# Patient Record
Sex: MALE | Race: ASIAN
[De-identification: names, ages, dates, MRNs, and addresses within clinical notes are randomized per-mention and may not be internally consistent; named-entity substitution may affect disease eponyms.]

---

## 2022-05-13 ENCOUNTER — HOSPITAL ENCOUNTER (INPATIENT)
Dept: HOSPITAL 54 - ER | Age: 47
LOS: 2 days | Discharge: HOME | DRG: 48 | End: 2022-05-15
Attending: INTERNAL MEDICINE | Admitting: INTERNAL MEDICINE
Payer: COMMERCIAL

## 2022-05-13 VITALS — WEIGHT: 220 LBS | HEIGHT: 67 IN | BODY MASS INDEX: 34.53 KG/M2

## 2022-05-13 DIAGNOSIS — I69.351: ICD-10-CM
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
## 2022-05-13 DIAGNOSIS — G90.8: Primary | ICD-10-CM

## 2022-05-13 DIAGNOSIS — I95.9: ICD-10-CM

## 2022-05-13 DIAGNOSIS — Z79.899: ICD-10-CM

## 2022-05-13 DIAGNOSIS — E88.09: ICD-10-CM

## 2022-05-13 DIAGNOSIS — E87.6: ICD-10-CM

## 2022-05-13 DIAGNOSIS — N17.0: ICD-10-CM

## 2022-05-13 DIAGNOSIS — T50.2X5A: ICD-10-CM

## 2022-05-13 DIAGNOSIS — I10: ICD-10-CM

## 2022-05-13 LAB
ALBUMIN SERPL BCP-MCNC: 3.8 G/DL (ref 3.4–5)
ALP SERPL-CCNC: 37 U/L (ref 46–116)
ALT SERPL W P-5'-P-CCNC: 23 U/L (ref 12–78)
AST SERPL W P-5'-P-CCNC: 21 U/L (ref 15–37)
BASOPHILS # BLD AUTO: 0 K/UL (ref 0–0.2)
BASOPHILS NFR BLD AUTO: 0.3 % (ref 0–2)
BILIRUB DIRECT SERPL-MCNC: 0.2 MG/DL (ref 0–0.2)
BILIRUB SERPL-MCNC: 0.5 MG/DL (ref 0.2–1)
BUN SERPL-MCNC: 24 MG/DL (ref 7–18)
CALCIUM SERPL-MCNC: 8.6 MG/DL (ref 8.5–10.1)
CHLORIDE SERPL-SCNC: 102 MMOL/L (ref 98–107)
CO2 SERPL-SCNC: 27 MMOL/L (ref 21–32)
CREAT SERPL-MCNC: 2.1 MG/DL (ref 0.6–1.3)
EOSINOPHIL NFR BLD AUTO: 1.1 % (ref 0–6)
GLUCOSE SERPL-MCNC: 191 MG/DL (ref 74–106)
HCT VFR BLD AUTO: 44 % (ref 39–51)
HGB BLD-MCNC: 14.7 G/DL (ref 13.5–17.5)
LYMPHOCYTES NFR BLD AUTO: 1.4 K/UL (ref 0.8–4.8)
LYMPHOCYTES NFR BLD AUTO: 12.3 % (ref 20–44)
MCHC RBC AUTO-ENTMCNC: 33 G/DL (ref 31–36)
MCV RBC AUTO: 91 FL (ref 80–96)
MONOCYTES NFR BLD AUTO: 0.5 K/UL (ref 0.1–1.3)
MONOCYTES NFR BLD AUTO: 4 % (ref 2–12)
NEUTROPHILS # BLD AUTO: 9.5 K/UL (ref 1.8–8.9)
NEUTROPHILS NFR BLD AUTO: 82.3 % (ref 43–81)
PLATELET # BLD AUTO: 249 K/UL (ref 150–450)
POTASSIUM SERPL-SCNC: 3.6 MMOL/L (ref 3.5–5.1)
PROT SERPL-MCNC: 7.1 G/DL (ref 6.4–8.2)
RBC # BLD AUTO: 4.88 MIL/UL (ref 4.5–6)
SODIUM SERPL-SCNC: 137 MMOL/L (ref 136–145)
WBC NRBC COR # BLD AUTO: 11.6 K/UL (ref 4.3–11)

## 2022-05-13 PROCEDURE — G0378 HOSPITAL OBSERVATION PER HR: HCPCS

## 2022-05-13 PROCEDURE — G0480 DRUG TEST DEF 1-7 CLASSES: HCPCS

## 2022-05-13 PROCEDURE — C9803 HOPD COVID-19 SPEC COLLECT: HCPCS

## 2022-05-13 NOTE — NUR
TO ER BED 9. BIBRA FOR WITNESSED SYNOCOPAL EPISODE AT [facility]. PT STATES HX OF 
STROKE. CONNECTED TO MONITOR. AWAITING MD PECK

## 2022-05-14 VITALS — DIASTOLIC BLOOD PRESSURE: 63 MMHG | SYSTOLIC BLOOD PRESSURE: 94 MMHG

## 2022-05-14 VITALS — DIASTOLIC BLOOD PRESSURE: 77 MMHG | SYSTOLIC BLOOD PRESSURE: 124 MMHG

## 2022-05-14 VITALS — DIASTOLIC BLOOD PRESSURE: 73 MMHG | SYSTOLIC BLOOD PRESSURE: 101 MMHG

## 2022-05-14 VITALS — DIASTOLIC BLOOD PRESSURE: 75 MMHG | SYSTOLIC BLOOD PRESSURE: 104 MMHG

## 2022-05-14 VITALS — DIASTOLIC BLOOD PRESSURE: 55 MMHG | SYSTOLIC BLOOD PRESSURE: 90 MMHG

## 2022-05-14 VITALS — SYSTOLIC BLOOD PRESSURE: 92 MMHG | DIASTOLIC BLOOD PRESSURE: 44 MMHG

## 2022-05-14 LAB
ALBUMIN SERPL BCP-MCNC: 3.6 G/DL (ref 3.4–5)
ALP SERPL-CCNC: 36 U/L (ref 46–116)
ALT SERPL W P-5'-P-CCNC: 20 U/L (ref 12–78)
AST SERPL W P-5'-P-CCNC: 19 U/L (ref 15–37)
BASOPHILS # BLD AUTO: 0 K/UL (ref 0–0.2)
BASOPHILS NFR BLD AUTO: 0.6 % (ref 0–2)
BILIRUB SERPL-MCNC: 0.3 MG/DL (ref 0.2–1)
BILIRUB UR QL STRIP: (no result)
BUN SERPL-MCNC: 26 MG/DL (ref 7–18)
CALCIUM SERPL-MCNC: 8.3 MG/DL (ref 8.5–10.1)
CHLORIDE SERPL-SCNC: 104 MMOL/L (ref 98–107)
CO2 SERPL-SCNC: 26 MMOL/L (ref 21–32)
COLOR UR: YELLOW
CREAT SERPL-MCNC: 1.9 MG/DL (ref 0.6–1.3)
EOSINOPHIL NFR BLD AUTO: 2.7 % (ref 0–6)
GLUCOSE SERPL-MCNC: 111 MG/DL (ref 74–106)
GLUCOSE UR STRIP-MCNC: NEGATIVE MG/DL
HCT VFR BLD AUTO: 43 % (ref 39–51)
HGB BLD-MCNC: 14.1 G/DL (ref 13.5–17.5)
LEUKOCYTE ESTERASE UR QL STRIP: NEGATIVE
LYMPHOCYTES NFR BLD AUTO: 2.4 K/UL (ref 0.8–4.8)
LYMPHOCYTES NFR BLD AUTO: 28.1 % (ref 20–44)
MAGNESIUM SERPL-MCNC: 2.3 MG/DL (ref 1.8–2.4)
MCHC RBC AUTO-ENTMCNC: 33 G/DL (ref 31–36)
MCV RBC AUTO: 91 FL (ref 80–96)
MONOCYTES NFR BLD AUTO: 0.7 K/UL (ref 0.1–1.3)
MONOCYTES NFR BLD AUTO: 7.9 % (ref 2–12)
NEUTROPHILS # BLD AUTO: 5.1 K/UL (ref 1.8–8.9)
NEUTROPHILS NFR BLD AUTO: 60.7 % (ref 43–81)
NITRITE UR QL STRIP: NEGATIVE
PH UR STRIP: 6 [PH] (ref 5–8)
PHOSPHATE SERPL-MCNC: 3.4 MG/DL (ref 2.5–4.9)
PLATELET # BLD AUTO: 244 K/UL (ref 150–450)
POTASSIUM SERPL-SCNC: 3.5 MMOL/L (ref 3.5–5.1)
PROT SERPL-MCNC: 6.8 G/DL (ref 6.4–8.2)
PROT UR QL STRIP: 30 MG/DL
RBC # BLD AUTO: 4.68 MIL/UL (ref 4.5–6)
SODIUM SERPL-SCNC: 138 MMOL/L (ref 136–145)
TSH SERPL DL<=0.005 MIU/L-ACNC: 0.42 UIU/ML (ref 0.36–3.74)
UROBILINOGEN UR STRIP-MCNC: 1 EU/DL
WBC NRBC COR # BLD AUTO: 8.4 K/UL (ref 4.3–11)

## 2022-05-14 RX ADMIN — PANTOPRAZOLE SODIUM SCH MG: 40 TABLET, DELAYED RELEASE ORAL at 08:37

## 2022-05-14 NOTE — NUR
RN OPENING NOTE



PATIENT RECEIVED IN BED, AO X 4. ABLE TO RESPONDS ALL STIMULI. IN NO ACUTE DISTRESS NOTED. 
RESPIRATORY EVEN AND UNLABORED ON ROOM AIR. SKIN IS WARM TO TOUCH, KEEP CLEAN/DRY. KEPT 
ELEVATED HOB FOR ENSURE AIRWAY AND ASPIRATION PRECAUTION, ALSO LOWEST POSITION OF THE BED, 
S/R UP X 3, BED ALARM IS ON AT ALL THE TIMES. ALL SAFETY PRECAUTION APPLIED. CALL LIGHT 
WITHIN REACH, WILL CONTINUE TO MONITOR.

## 2022-05-14 NOTE — NUR
RN CLOSING NOTES



PT IN BED, ASLEEP, AWAKENS TO VERBAL STIMULI. AOx4, ABLE TO MAKE NEEDS KNOWN. ON RA AND 
TOLERATING WELL. NO SOB NOTED. NO S/SX OF RESPIRATORY DISTRESS NOTED. TELE MONITOR DETECTS 
SINUS BRADYCARDIA WITH RATE OF 58. IV ACCESS IN R HAND #20G RUNNING NS @75 ML/HR. ALL ORDERS 
CARRIED OUT. ALL NEEDS MET. PT KEPT CLEAN AND DRY. SAFETY PRECAUTIONS IN PLACE: BED  IN 
LOWEST, LOCKED POSITION, SIDERAILS UPx2, AND BRAKES ON. TABLE AND CALL LIGHT WITHIN REACH. 
WILL ENDORSE TO ONCOMING SHIFT FOR FRANCISCO.

## 2022-05-14 NOTE — NUR
RN NOTE



PATIENT AWAKE IN BED. A/OX4. NO S/S OF DISTRESS; BREATHING WELL ON RM AIR W/O DIFFICULTY. 
L-HAND #22 INTACT AND PATENT W/ NS 75ML/HR. TELE MONITOR REVEALS SR 76. SAFETY MEASURES IN 
PLACE: BED AT LOWEST POSITION, LOCKED, RAILS UP X3, CALL BELL WITHIN REACH. WILL CONTINUE TO 
MONITOR.

## 2022-05-14 NOTE — NUR
RN CLOSING NOTE



PATIENT IN BED, IN NO ACUTE DISTRESS OBSERVED. SKIN IS WARM TO TOUCH KEEP CLEAN/DRY. 
RESPIRATORY EVEN AND UNLABORED ON ROOM AIR. PATIENT WAITING US KIDNEY AND BLADDER TO BE 
DONE. KEPT ELEVATED HOB FOR ENSURE AIRWAY AND ASPIRATION PRECAUTION, AND LOWEST POSITION OF 
THE BED FOR SAFETY. CALL LIGHT WITHIN REACH, WILL ENDORSE TO NIGHT SHIFT.

## 2022-05-14 NOTE — NUR
ADMISSION NOTES



PT ARRIVED VIA GURNEY ACCOMPANIED BY RN AND EMT @0250. AOx4, ABLE TO MAKE NEEDS KNOWN. ON RA 
AND TOLERATING WELL. NO SOB NOTED. NO S/SX OF RESPIRATORY DISTRESS NOTED. TELE MONITOR 
DETECTS SINUS BRADYCARDIA WITH RATE OF 58. IV ACCES IN R HAND #20G RUNNING NS @75 ML/HR. 
SAFETY PRECAUTIONS IN PLACE: BED  IN LOWEST, LOCKED POSITION, SIDERAILS UPx2, AND BRAKES ON. 
TABLE AND CALL LIGHT WITHIN REACH. WILL CONTINUE TO MONITOR.

## 2022-05-15 VITALS — DIASTOLIC BLOOD PRESSURE: 72 MMHG | SYSTOLIC BLOOD PRESSURE: 107 MMHG

## 2022-05-15 VITALS — DIASTOLIC BLOOD PRESSURE: 60 MMHG | SYSTOLIC BLOOD PRESSURE: 121 MMHG

## 2022-05-15 VITALS — SYSTOLIC BLOOD PRESSURE: 127 MMHG | DIASTOLIC BLOOD PRESSURE: 88 MMHG

## 2022-05-15 LAB
ALBUMIN SERPL BCP-MCNC: 3.4 G/DL (ref 3.4–5)
ALP SERPL-CCNC: 35 U/L (ref 46–116)
ALT SERPL W P-5'-P-CCNC: 20 U/L (ref 12–78)
AST SERPL W P-5'-P-CCNC: 14 U/L (ref 15–37)
BASOPHILS # BLD AUTO: 0 K/UL (ref 0–0.2)
BASOPHILS NFR BLD AUTO: 0.5 % (ref 0–2)
BILIRUB SERPL-MCNC: 0.3 MG/DL (ref 0.2–1)
BUN SERPL-MCNC: 16 MG/DL (ref 7–18)
BUN SERPL-MCNC: 20 MG/DL (ref 7–18)
CALCIUM SERPL-MCNC: 6.1 MG/DL (ref 8.5–10.1)
CALCIUM SERPL-MCNC: 8.3 MG/DL (ref 8.5–10.1)
CHLORIDE SERPL-SCNC: 107 MMOL/L (ref 98–107)
CHLORIDE SERPL-SCNC: 114 MMOL/L (ref 98–107)
CO2 SERPL-SCNC: 24 MMOL/L (ref 21–32)
CO2 SERPL-SCNC: 27 MMOL/L (ref 21–32)
CREAT SERPL-MCNC: 1 MG/DL (ref 0.6–1.3)
CREAT SERPL-MCNC: 1.4 MG/DL (ref 0.6–1.3)
EOSINOPHIL NFR BLD AUTO: 1.4 % (ref 0–6)
GLUCOSE SERPL-MCNC: 111 MG/DL (ref 74–106)
GLUCOSE SERPL-MCNC: 80 MG/DL (ref 74–106)
HCT VFR BLD AUTO: 40 % (ref 39–51)
HGB BLD-MCNC: 13.2 G/DL (ref 13.5–17.5)
LYMPHOCYTES NFR BLD AUTO: 1.6 K/UL (ref 0.8–4.8)
LYMPHOCYTES NFR BLD AUTO: 19.8 % (ref 20–44)
MAGNESIUM SERPL-MCNC: 1.7 MG/DL (ref 1.8–2.4)
MCHC RBC AUTO-ENTMCNC: 33 G/DL (ref 31–36)
MCV RBC AUTO: 92 FL (ref 80–96)
MONOCYTES NFR BLD AUTO: 0.5 K/UL (ref 0.1–1.3)
MONOCYTES NFR BLD AUTO: 5.6 % (ref 2–12)
NEUTROPHILS # BLD AUTO: 5.8 K/UL (ref 1.8–8.9)
NEUTROPHILS NFR BLD AUTO: 72.7 % (ref 43–81)
PHOSPHATE SERPL-MCNC: 2.4 MG/DL (ref 2.5–4.9)
PLATELET # BLD AUTO: 206 K/UL (ref 150–450)
POTASSIUM SERPL-SCNC: 2.8 MMOL/L (ref 3.5–5.1)
POTASSIUM SERPL-SCNC: 4.2 MMOL/L (ref 3.5–5.1)
PROT SERPL-MCNC: 6.7 G/DL (ref 6.4–8.2)
RBC # BLD AUTO: 4.33 MIL/UL (ref 4.5–6)
SODIUM SERPL-SCNC: 138 MMOL/L (ref 136–145)
SODIUM SERPL-SCNC: 144 MMOL/L (ref 136–145)
TSH SERPL DL<=0.005 MIU/L-ACNC: 0.26 UIU/ML (ref 0.36–3.74)
WBC NRBC COR # BLD AUTO: 8 K/UL (ref 4.3–11)

## 2022-05-15 RX ADMIN — PANTOPRAZOLE SODIUM SCH MG: 40 TABLET, DELAYED RELEASE ORAL at 08:16

## 2022-05-15 RX ADMIN — SODIUM CHLORIDE SCH MLS/HR: 9 INJECTION, SOLUTION INTRAVENOUS at 11:55

## 2022-05-15 RX ADMIN — SODIUM CHLORIDE SCH MLS/HR: 9 INJECTION, SOLUTION INTRAVENOUS at 08:15

## 2022-05-15 NOTE — NUR
TELE RN NUBIA NOTES:\



RECEIVED PT IN BED, AWAKE AND ABLE TO VERBALIZED NEEDS. NO COMPLAIN OF ANY PAIN OR WEAKNESS 
AS OF NOW, NO SOB OR NO CARDIAC DISTRESS NOTED, ON ROOM AIR TOLERATING WELL. NOTED WITH IV 
PERIPHERAL LINE ON LEFT HAND G#22 WITH NS @75ML/HR INFUSING WELL. ON TELE MONITOR: WITH 
READING OF SINUS RYTHM HR 60. MAINTAINED PRECAUTIONARY MEASURES: BED IN LOWEST POSITION, 
LOCKED WITH BED ALARM, SIDE RAILS UP X2. CALL LIGHT IN EASY REACH FOR HELP/ASSISTANCE.

## 2022-05-15 NOTE — NUR
RN DISCHARGED NOTES:



PATIENT DISCHARGED HOME IN STABLE CONDITION. A/O X4. ABLE TO MAKE NEEDS KNOWN. ON ROOM AIR, 
TOLERATING WELL. ALL BELONGINGS ACCOUNTED FOR AND PT SIGNED BELONGINGS LIST. IV ACCESS ON  
LEFT HAND REMOVED WITH NO ACTIVE BLEEDING NOTED, DRY PRESSURE DRESSING APPLIED @ SITE. 
DISCHARGE INSTRUCTIONS/HEALTH TEACHINGS GIVEN TO PT AND VERBALIZED UNDERSTANDING. NAME 
ARMBAND REMOVED. PT LEFT UNIT VIA WHEELCHAIR @-1900 ACCOMPANIED RN ALENA AND PT'S AMBER MORAN.  MD AND CHARGE NURSE AWARE OF DISCHARGE.

## 2022-05-15 NOTE — NUR
RN CLOSING NOTE



PATIENT AWAKE IN BED. A/OX4. NO S/S OF DISTRESS; BREATHING WELL ON RM AIR. L-HAND #22 SL W/ 
NS 75ML/HR. TELE MONITOR REVEALS SR 60. SAFETY MEASURES IN PLACE: BED AT LOWEST POSITION, 
LOCKED, RAILS UP X3, CALL BELL WITHIN REACH. 



REPORT ENDORSED TO AND ACKNOWLEDGED BY RNALENA, FOR FRANCISCO.

## 2022-05-15 NOTE — NUR
RN NOTE



ON-CALL DR. VALENTE RESPONDED BACK W/ ORDER FOR KCL 40 MEQ IV X1. ORDER PLACED. WILL 
ENDORSE TO NEXT SHIFT.

## 2022-05-15 NOTE — NUR
MS RN CLOSING NOTES:



PATIENT IN RESTING IN  BED, AWAKE AND ABLE TO VERBALIZED NEEDS. WAITING FOR  TO PICKED 
HIM UP TO GO HOME. NO COMPLAIN OF ANY PAIN OR WEAKNESS AS OF NOW, NO SOB OR NO CARDIAC 
DISTRESS NOTED, ON ROOM AIR TOLERATING WELL. NOTED WITH IV ACCESS, INTACT IN LEFT HAND G#22 
SALINE LOCKED. MAINTAINED PRECAUTIONARY MEASURES: BED IN LOWEST POSITION, LOCKED WITH BED 
ALARM, SIDE RAILS UP X2. CALL LIGHT IN EASY REACH FOR HELP.

## 2022-05-15 NOTE — NUR
RN NOTE



LAB CONTACTED RN W/ CRITICAL VALUE: POTASSIUM 2.8. ON-CALL MD DR. VALENTE NOTIFIED, AS WELL 
AS CHARGE NURSE, DALY.



PATIENT STABLE; WILL CONTINUE TO MONITOR PATIENT.